# Patient Record
Sex: MALE | Race: WHITE
[De-identification: names, ages, dates, MRNs, and addresses within clinical notes are randomized per-mention and may not be internally consistent; named-entity substitution may affect disease eponyms.]

---

## 2020-07-11 ENCOUNTER — HOSPITAL ENCOUNTER (EMERGENCY)
Dept: HOSPITAL 47 - EC | Age: 30
LOS: 1 days | Discharge: HOME | End: 2020-07-12
Payer: COMMERCIAL

## 2020-07-11 VITALS — TEMPERATURE: 98.2 F

## 2020-07-11 DIAGNOSIS — T63.441A: Primary | ICD-10-CM

## 2020-07-11 DIAGNOSIS — F17.200: ICD-10-CM

## 2020-07-11 DIAGNOSIS — Z91.030: ICD-10-CM

## 2020-07-11 PROCEDURE — 96375 TX/PRO/DX INJ NEW DRUG ADDON: CPT

## 2020-07-11 PROCEDURE — 96361 HYDRATE IV INFUSION ADD-ON: CPT

## 2020-07-11 PROCEDURE — 96374 THER/PROPH/DIAG INJ IV PUSH: CPT

## 2020-07-11 PROCEDURE — 99283 EMERGENCY DEPT VISIT LOW MDM: CPT

## 2020-07-12 VITALS — RESPIRATION RATE: 17 BRPM | DIASTOLIC BLOOD PRESSURE: 77 MMHG | HEART RATE: 86 BPM | SYSTOLIC BLOOD PRESSURE: 122 MMHG

## 2020-09-14 ENCOUNTER — HOSPITAL ENCOUNTER (EMERGENCY)
Dept: HOSPITAL 47 - EC | Age: 30
Discharge: HOME | End: 2020-09-14
Payer: COMMERCIAL

## 2020-09-14 VITALS — TEMPERATURE: 97.9 F | SYSTOLIC BLOOD PRESSURE: 126 MMHG | HEART RATE: 88 BPM | DIASTOLIC BLOOD PRESSURE: 98 MMHG

## 2020-09-14 VITALS — RESPIRATION RATE: 18 BRPM

## 2020-09-14 DIAGNOSIS — F17.200: ICD-10-CM

## 2020-09-14 DIAGNOSIS — M54.5: Primary | ICD-10-CM
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
## 2020-09-14 DIAGNOSIS — Z91.030: ICD-10-CM

## 2020-09-14 DIAGNOSIS — M25.512: ICD-10-CM

## 2020-09-14 PROCEDURE — 71045 X-RAY EXAM CHEST 1 VIEW: CPT

## 2020-09-14 PROCEDURE — 72131 CT LUMBAR SPINE W/O DYE: CPT

## 2020-09-14 PROCEDURE — 72110 X-RAY EXAM L-2 SPINE 4/>VWS: CPT

## 2020-09-14 PROCEDURE — 99284 EMERGENCY DEPT VISIT MOD MDM: CPT

## 2020-09-14 PROCEDURE — 96372 THER/PROPH/DIAG INJ SC/IM: CPT
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
## 2020-10-12 ENCOUNTER — HOSPITAL ENCOUNTER (OUTPATIENT)
Dept: HOSPITAL 47 - RADMRIMAIN | Age: 30
Discharge: HOME | End: 2020-10-12
Attending: ORTHOPAEDIC SURGERY
Payer: COMMERCIAL

## 2020-10-12 DIAGNOSIS — M51.46: ICD-10-CM

## 2020-10-12 DIAGNOSIS — M48.56XA: Primary | ICD-10-CM

## 2020-10-12 PROCEDURE — 72148 MRI LUMBAR SPINE W/O DYE: CPT

## 2020-10-12 NOTE — MR
EXAMINATION TYPE: MR lumbar spine wo con

 

DATE OF EXAM: 10/12/2020

 

COMPARISON: CT lumbar spine 9/14/2020

 

HISTORY: Low back pain

 

TECHNIQUE: 

Multiplanar, multisequence images of the lumbar spine were acquired.

 

Spinal alignment is normal. There is approximately 25% mild superior endplate acute compression fract
ure deformity of L1 with T2 hyperintense edema. No evidence of significant retropulsion. There is Kiana
morl's node deformity of the superior endplate of L2 without vertebral body height loss. There is an 
ovoid well circumscribed nonexpansile nonaggressive appearing T1 hyperintense and T2 hyperintense les
ion of S2, likely benign lesion such as hemangioma or lipoma. Vertebral bone marrow is normal in sign
al.

 

There is disc desiccation of L1-L2. Lower thoracic cord is normal in signal. Conus terminates normall
y at L1. Cauda equina nerve roots are normal in course and caliber. 

 

Paraspinal soft tissues are unremarkable. Visualized upper sacroiliac joints appear intact.

 

T12-L1: Minimal posterior disc bulging effaces the ventral aspect of the thecal sac. No canal stenosi
s. Foramina are patent bilaterally.

 

L1-L2: Posterior disc bulging effaces the ventral aspect of the thecal sac. No canal stenosis. Forami
na are patent bilaterally.

 

L2-L3: No posterior disc herniation, protrusion, or bulging. No canal stenosis. Foramina are patent b
ilaterally.

 

L3-L4: No posterior disc herniation, protrusion, or bulging. No canal stenosis. Foramina are patent b
ilaterally.

 

L4-L5: No posterior disc herniation, protrusion, or bulging. No canal stenosis. Foramina are patent b
ilaterally.

 

L5-S1: No posterior disc herniation, protrusion, or bulging. No canal stenosis. Foramina are patent b
ilaterally.

 

 

 

IMPRESSION:

1.  Acute L1 superior endplate mild compression fracture deformity with up to 25% height loss. No sig
nificant retropulsion.

2.  Schmorl's node deformity of the L2 superior endplate with no compression deformity.